# Patient Record
Sex: FEMALE | Race: WHITE | NOT HISPANIC OR LATINO | URBAN - METROPOLITAN AREA
[De-identification: names, ages, dates, MRNs, and addresses within clinical notes are randomized per-mention and may not be internally consistent; named-entity substitution may affect disease eponyms.]

---

## 2021-06-10 ENCOUNTER — OFFICE VISIT (OUTPATIENT)
Dept: URBAN - METROPOLITAN AREA CLINIC 78 | Facility: CLINIC | Age: 61
End: 2021-06-10
Payer: COMMERCIAL

## 2021-06-10 ENCOUNTER — TELEPHONE ENCOUNTER (OUTPATIENT)
Dept: URBAN - METROPOLITAN AREA CLINIC 78 | Facility: CLINIC | Age: 61
End: 2021-06-10

## 2021-06-10 ENCOUNTER — DASHBOARD ENCOUNTERS (OUTPATIENT)
Age: 61
End: 2021-06-10

## 2021-06-10 DIAGNOSIS — R23.8 EASY BRUISING: ICD-10-CM

## 2021-06-10 DIAGNOSIS — R93.89 ABNORMAL CT SCAN: ICD-10-CM

## 2021-06-10 DIAGNOSIS — Z98.84 GASTRIC BYPASS STATUS FOR OBESITY: ICD-10-CM

## 2021-06-10 DIAGNOSIS — Z12.11 SCREENING FOR COLON CANCER: ICD-10-CM

## 2021-06-10 DIAGNOSIS — R13.13 PHARYNGEAL DYSPHAGIA: ICD-10-CM

## 2021-06-10 PROBLEM — 129679001: Status: ACTIVE | Noted: 2021-06-10

## 2021-06-10 PROCEDURE — 99204 OFFICE O/P NEW MOD 45 MIN: CPT | Performed by: INTERNAL MEDICINE

## 2021-06-10 RX ORDER — SODIUM, POTASSIUM,MAG SULFATES 17.5-3.13G
177 ML DAY 1 AND 177 ML DAY 2 SOLUTION, RECONSTITUTED, ORAL ORAL
Qty: 354 MILLILITER | Refills: 0 | OUTPATIENT
Start: 2021-06-10 | End: 2021-06-12

## 2021-06-10 NOTE — HPI-TODAY'S VISIT:
Patient was referred by Dr.Lisa Ly A copy of this document will be sent to the physician.     Patient is s/p gastric bypass in 2012 ( Dr Ruiz)  . Was also dx with early cirrhosis intraoperative . She was also dx with H/H  She is here because her PCP is concerned about her liver  Patient is planning to make overseas trip to [place] for 3 months   Patient has kidney stones and saw Urologist Dr Awais Barrett  As part of work up she had  Imaging and incidental findings of CT scan prompted more imaging  3/24/21 1.s/p gastric bypass  2. Non specific swirling of vs of central mesentry and multiple collateral vs in rt retroperitoneum 3. Kidney stones    Other than easy brusing and recall hx of early cirrhosis pre gastric bypass , denies any known liver problems since then   (-) abdominal pain , heartburn , reflux  Occassional dysphagia but otherwise no complains  (+) stressors significant  She would not have sought care for her easy brusing or weight if she did not have kidney stones. She just goes for annual exams   Cants keep weight on now ( 354 lbs down to 119 lb) since gastric bypass   Never had colonoscopy .. Has just done stool test based screening couple of years ago reportedly neg. EGD may be pre surgery  She eats often  BM  described as  "perfect " goes regularly Patient is scheduled to see Vein specialist  Dr Chavarria with [facility] June 17 2021     The patient denies any significant alcohol use, tobacco use or illicit drug use.  CT urogram with and without contrast dated 3/24/2021:  Liver is reported as normal, gallbladder is unremarkable, no calcified gallstones, no intra or extrahepatic biliary ductal dilatation.  Spleen is normal limits.  1.3 cm hypodensity in spleen.  Generalized atrophic pancreas.  Next  Nonspecific swirling of vessels in the central mesentery and multiple prominent collateral vessels in the right retroperitoneum with probable mesenteric portal shunt.  Findings are nonspecific but may reflect presence of underlying internal hernia.  No evidence of bowel obstruction and/or small bowel loops throughout the abdomen and pelvis are nondilated and decompressed.    MRI abdomen with and without contrast ordered on 6/4/2021 by primary care physician  It is done to follow-up on the adrenal nodule findings please refer to the MRI report.  Liver and spleen are reported as normal in contour.  Hepatic cysts and cyst in the central spleen.

## 2021-06-14 PROBLEM — 21101000119105: Status: ACTIVE | Noted: 2021-06-10

## 2021-06-25 ENCOUNTER — OFFICE VISIT (OUTPATIENT)
Dept: URBAN - METROPOLITAN AREA SURGERY CENTER 15 | Facility: SURGERY CENTER | Age: 61
End: 2021-06-25